# Patient Record
Sex: MALE | URBAN - METROPOLITAN AREA
[De-identification: names, ages, dates, MRNs, and addresses within clinical notes are randomized per-mention and may not be internally consistent; named-entity substitution may affect disease eponyms.]

---

## 2022-08-10 ENCOUNTER — OFFICE VISIT (OUTPATIENT)
Dept: URBAN - METROPOLITAN AREA CLINIC 82 | Facility: CLINIC | Age: 27
End: 2022-08-10
Payer: COMMERCIAL

## 2022-08-10 ENCOUNTER — DASHBOARD ENCOUNTERS (OUTPATIENT)
Age: 27
End: 2022-08-10

## 2022-08-10 ENCOUNTER — LAB OUTSIDE AN ENCOUNTER (OUTPATIENT)
Dept: URBAN - METROPOLITAN AREA CLINIC 82 | Facility: CLINIC | Age: 27
End: 2022-08-10

## 2022-08-10 VITALS
WEIGHT: 315 LBS | TEMPERATURE: 97 F | SYSTOLIC BLOOD PRESSURE: 141 MMHG | HEIGHT: 75 IN | BODY MASS INDEX: 39.17 KG/M2 | DIASTOLIC BLOOD PRESSURE: 80 MMHG | HEART RATE: 86 BPM

## 2022-08-10 DIAGNOSIS — E66.01 MORBID OBESITY: ICD-10-CM

## 2022-08-10 DIAGNOSIS — K52.9 CHRONIC DIARRHEA OF UNKNOWN ORIGIN: ICD-10-CM

## 2022-08-10 PROCEDURE — 99202 OFFICE O/P NEW SF 15 MIN: CPT | Performed by: INTERNAL MEDICINE

## 2022-08-10 NOTE — HPI-TODAY'S VISIT:
27 y/o black man with obesity BMI 43,HTN that came for eval given chronic diarrhea for years.IRefer symptoms get worse with milk products,I ask him about lactose intolerance but no test have been done to confirm that.Denies weight loss,rectal bleeding,black stools or abdominal pain.No smoke,no drink alcohol.no family hx of CRC.

## 2022-08-11 PROBLEM — 17551007: Status: ACTIVE | Noted: 2022-08-10

## 2022-08-11 PROBLEM — 238136002: Status: ACTIVE | Noted: 2022-08-11

## 2022-09-08 ENCOUNTER — OFFICE VISIT (OUTPATIENT)
Dept: URBAN - METROPOLITAN AREA SURGERY CENTER 13 | Facility: SURGERY CENTER | Age: 27
End: 2022-09-08